# Patient Record
Sex: MALE | Race: OTHER | Employment: FULL TIME | URBAN - METROPOLITAN AREA
[De-identification: names, ages, dates, MRNs, and addresses within clinical notes are randomized per-mention and may not be internally consistent; named-entity substitution may affect disease eponyms.]

---

## 2017-06-29 ENCOUNTER — TELEPHONE (OUTPATIENT)
Dept: FAMILY MEDICINE CLINIC | Facility: CLINIC | Age: 37
End: 2017-06-29

## 2017-06-30 ENCOUNTER — TELEPHONE (OUTPATIENT)
Dept: FAMILY MEDICINE CLINIC | Facility: CLINIC | Age: 37
End: 2017-06-30

## 2017-09-20 ENCOUNTER — OFFICE VISIT (OUTPATIENT)
Dept: FAMILY MEDICINE CLINIC | Facility: CLINIC | Age: 37
End: 2017-09-20

## 2017-09-20 VITALS
HEIGHT: 67 IN | WEIGHT: 207 LBS | RESPIRATION RATE: 16 BRPM | DIASTOLIC BLOOD PRESSURE: 70 MMHG | HEART RATE: 60 BPM | BODY MASS INDEX: 32.49 KG/M2 | TEMPERATURE: 98 F | SYSTOLIC BLOOD PRESSURE: 112 MMHG

## 2017-09-20 DIAGNOSIS — L65.9 HAIR LOSS: Primary | ICD-10-CM

## 2017-09-20 PROCEDURE — 99213 OFFICE O/P EST LOW 20 MIN: CPT | Performed by: NURSE PRACTITIONER

## 2017-09-20 NOTE — PATIENT INSTRUCTIONS
Thank you for choosing [facility] at [place] [id number]  To Do: Georges Mohs  1. Go to the lab for blood work  2.  Make appt for dermatology to [facility] for alopecia     Effective 6/19/17 until November 2017  Due to [employer] is be risk of harm or side effects or medication interactions.  It is your duty and for your safety to discuss with the pharmacist and our office with questions, and to notify us and stop treatment if problems arise, but know that our intention is that the benef

## 2017-09-20 NOTE — PROGRESS NOTES
[facility] Internal Medicine Office Note  Chief Complaint:   Patient presents with:  Hair/Scalp Problem: hair loss back of head first noticed about 3 months ago by his stark. Imm/Inj: declines the Flu vaccine today.     HPI:   This is a 30 year Constitutional: He is oriented to person, place, and time. Vital signs are normal. He appears well-developed and well-nourished. HENT:   Head: Normocephalic and atraumatic.    Right Ear: Hearing normal.   Left Ear: Hearing normal.   Nose: Nose normal.   M Depression Screening (PHQ-2/PHQ-9): Over the LAST 2 WEEKS   Little interest or pleasure in doing things (over the last two weeks)?: Not at all    Feeling down, depressed, or hopeless (over the last two weeks)?: Not at all    PHQ-2 SCORE: 0

## 2017-09-25 ENCOUNTER — APPOINTMENT (OUTPATIENT)
Dept: LAB | Age: 37
End: 2017-09-25
Attending: INTERNAL MEDICINE
Payer: COMMERCIAL

## 2017-09-25 DIAGNOSIS — L65.9 HAIR LOSS: ICD-10-CM

## 2017-09-25 LAB
DEPRECATED HBV CORE AB SER IA-ACNC: 437.7 NG/ML (ref 22–322)
IRON: 89 UG/DL (ref 45–182)
TSI SER-ACNC: 2.26 MIU/ML (ref 0.35–5.5)

## 2017-09-25 PROCEDURE — 36415 COLL VENOUS BLD VENIPUNCTURE: CPT | Performed by: NURSE PRACTITIONER

## 2017-09-25 PROCEDURE — 83540 ASSAY OF IRON: CPT | Performed by: NURSE PRACTITIONER

## 2017-09-25 PROCEDURE — 84443 ASSAY THYROID STIM HORMONE: CPT | Performed by: NURSE PRACTITIONER

## 2017-09-25 PROCEDURE — 82728 ASSAY OF FERRITIN: CPT | Performed by: NURSE PRACTITIONER

## 2018-09-12 PROBLEM — E66.9 OBESITY (BMI 30-39.9): Status: ACTIVE | Noted: 2018-09-12

## 2018-09-12 PROBLEM — E78.5 DYSLIPIDEMIA: Status: ACTIVE | Noted: 2018-09-12

## 2019-06-20 ENCOUNTER — TELEPHONE (OUTPATIENT)
Dept: FAMILY MEDICINE CLINIC | Facility: CLINIC | Age: 39
End: 2019-06-20

## 2019-06-20 NOTE — TELEPHONE ENCOUNTER
Spoke with pt, advised that we received lab results for him and confirmed that pt sees Dr. Aby Carlos as PCP in [place]. Advised pt I will fax results to Dr. Martina Spencer office. Faxed labs to Dr. Martina Spencer office. Fax confirmation received.

## 2020-08-23 ENCOUNTER — HOSPITAL ENCOUNTER (OUTPATIENT)
Age: 40
Discharge: HOME | End: 2020-08-23
Payer: COMMERCIAL

## 2020-08-23 VITALS
OXYGEN SATURATION: 98 % | HEART RATE: 69 BPM | DIASTOLIC BLOOD PRESSURE: 76 MMHG | RESPIRATION RATE: 18 BRPM | TEMPERATURE: 98 F | SYSTOLIC BLOOD PRESSURE: 143 MMHG

## 2020-08-23 DIAGNOSIS — Z20.822 ENCOUNTER FOR LABORATORY TESTING FOR COVID-19 VIRUS: Primary | ICD-10-CM

## 2020-08-23 PROCEDURE — U0003 INFECTIOUS AGENT DETECTION BY NUCLEIC ACID (DNA OR RNA); SEVERE ACUTE RESPIRATORY SYNDROME CORONAVIRUS 2 (SARS-COV-2) (CORONAVIRUS DISEASE [COVID-19]), AMPLIFIED PROBE TECHNIQUE, MAKING USE OF HIGH THROUGHPUT TECHNOLOGIES AS DESCRIBED BY CMS-2020-01-R: HCPCS | Performed by: PHYSICIAN ASSISTANT

## 2020-08-23 PROCEDURE — 99202 OFFICE O/P NEW SF 15 MIN: CPT | Performed by: PHYSICIAN ASSISTANT

## 2020-08-23 NOTE — ED PROVIDER NOTES
Patient Seen in: [address] [facility]      History   Patient presents with:  Testing: covid    Stated Complaint: covid testing    HPI    47-year-old male. Was with his girlfriend last night.   They were informed today that her brother is posit COVID-19            Disposition and Plan     Clinical Impression:  Encounter for laboratory testing for COVID-19 virus  (primary encounter diagnosis)    Disposition:  Discharge  8/23/2020  2:15 pm    Follow-up:  Yordan Monae MD  [address] [facility]

## 2020-08-26 LAB — SARS-COV-2 BY PCR: NOT DETECTED

## 2020-10-15 ENCOUNTER — HOSPITAL ENCOUNTER (OUTPATIENT)
Age: 40
Discharge: HOME | End: 2020-10-15
Payer: COMMERCIAL

## 2020-10-15 VITALS
RESPIRATION RATE: 18 BRPM | HEIGHT: 67 IN | WEIGHT: 209 LBS | BODY MASS INDEX: 32.8 KG/M2 | HEART RATE: 57 BPM | SYSTOLIC BLOOD PRESSURE: 128 MMHG | DIASTOLIC BLOOD PRESSURE: 83 MMHG | OXYGEN SATURATION: 98 % | TEMPERATURE: 98 F

## 2020-10-15 DIAGNOSIS — T16.1XXA FOREIGN BODY OF RIGHT EAR, INITIAL ENCOUNTER: Primary | ICD-10-CM

## 2020-10-15 DIAGNOSIS — S00.411A EAR CANAL ABRASION, RIGHT, INITIAL ENCOUNTER: ICD-10-CM

## 2020-10-15 PROCEDURE — 69200 CLEAR OUTER EAR CANAL: CPT

## 2020-10-15 PROCEDURE — 99214 OFFICE O/P EST MOD 30 MIN: CPT

## 2020-10-15 PROCEDURE — 99213 OFFICE O/P EST LOW 20 MIN: CPT

## 2020-10-15 RX ORDER — NEOMYCIN SULFATE, POLYMYXIN B SULFATE AND HYDROCORTISONE 10; 3.5; 1 MG/ML; MG/ML; [USP'U]/ML
3 SUSPENSION/ DROPS AURICULAR (OTIC) 3 TIMES DAILY
Qty: 1 BOTTLE | Refills: 0 | Status: SHIPPED | OUTPATIENT
Start: 2020-10-15 | End: 2020-10-22

## 2020-10-15 NOTE — ED PROVIDER NOTES
Patient Seen in: [facility]      History   Patient presents with:  Ear Problem Pain    Stated Complaint: ear issue x 5 days    HPI    Patient is a 33-year-old male who comes in today complaining of having a Q-tip stuck in his right ear fo exudates or tonsillar hypertrophy, uvula midline, no trismus or drooling no phonation changes, patient handling secretions well   Neck: Supple; no anterior or posterior cervical adenopathy, no neck rigidity or meningeal signs  Lungs: Clear to auscultation

## 2025-03-12 ENCOUNTER — HOSPITAL ENCOUNTER (EMERGENCY)
Age: 45
Discharge: HOME | End: 2025-03-12
Payer: COMMERCIAL

## 2025-03-12 VITALS
DIASTOLIC BLOOD PRESSURE: 98 MMHG | WEIGHT: 215 LBS | SYSTOLIC BLOOD PRESSURE: 159 MMHG | HEART RATE: 76 BPM | HEIGHT: 66 IN | TEMPERATURE: 99 F | RESPIRATION RATE: 18 BRPM | OXYGEN SATURATION: 96 % | BODY MASS INDEX: 34.55 KG/M2

## 2025-03-12 DIAGNOSIS — I83.899 BLEEDING FROM VARICOSE VEIN: Primary | ICD-10-CM

## 2025-03-12 PROCEDURE — 12001 RPR S/N/AX/GEN/TRNK 2.5CM/<: CPT

## 2025-03-12 PROCEDURE — 99283 EMERGENCY DEPT VISIT LOW MDM: CPT

## 2025-03-12 NOTE — ED INITIAL ASSESSMENT (HPI)
States has varicosity on right lower leg for many weeks, today it just started bleeding after getting out of shower and was \"squiring blood\" pressure drsg. In place from home

## 2025-03-12 NOTE — DISCHARGE INSTRUCTIONS
Make an appointment with your doctor or go to an immediate care or return to the ER for suture removal in the next 7 to 10 days.  Keep area covered until sutures are removed, change dressing daily.  Gentle weightbearing as tolerated, rest and elevate leg is much as possible for the next 1 to 2 days.  Return to the ER for any other new or worsening symptoms.

## 2025-03-12 NOTE — ED PROVIDER NOTES
Patient Seen in: [facility] [place]      History     Chief Complaint   Patient presents with    Abscess     Stated Complaint: right leg cyst, draining today    Subjective:   HPI    44-year-old male with no significant past medical history presents to the ER for bleeding varicose vein of the right lower extremity.  States this morning he was taking a shower and when he got out of the shower he noticed that his varicose vein was bleeding, blood was shooting out onto the floor.  States he quickly wrapped the area and came to the ER.  He does not take a blood thinner.  Denies any other injury to the area.  No other complaints.    Objective:     Past Medical History:    Hyperuricemia    Liver abscess (HCC)    Murmur, cardiac    Pancreatitis (HCC)              History reviewed. No pertinent surgical history.             Social History     Socioeconomic History    Marital status: Single   Tobacco Use    Smoking status: Never    Smokeless tobacco: Never   Vaping Use    Vaping status: Never Used   Substance and Sexual Activity    Alcohol use: Yes     Alcohol/week: 1.7 standard drinks of alcohol     Types: 2 Cans of beer per week     Comment: socially    Drug use: No                  Physical Exam     ED Triage Vitals [03/12/25 1021]   BP (!) 159/98   Pulse 76   Resp 18   Temp 98.6 °F (37 °C)   Temp src Temporal   SpO2 96 %   O2 Device None (Room air)       Current Vitals:   Vital Signs  BP: (!) 159/98  Pulse: 76  Resp: 18  Temp: 98.6 °F (37 °C)  Temp src: Temporal    Oxygen Therapy  SpO2: 96 %  O2 Device: None (Room air)        Physical Exam  GENERAL APPEARANCE:  AxOx4, generally well-appearing, no acute distress.  HEENT:  NC, AT.   NECK:  Supple without lymphadenopathy.  No stiffness or restricted ROM.  RESPIRATORY: Normal rate, no respiratory distress.  EXTREMITIES:  Without cyanosis, clubbing or edema. Normal ROM.  NEUROLOGICAL:  Grossly nonfocal. Observed to ambulate with normal gait.  Skin:  Varicose veins over the right lower extremity.  There is a superficial varicose actively bleeding vein of the right anterior medial aspect of the shin.  Neurovascularly intact distally in lower extremity.         ED Course   Labs Reviewed - No data to display         SUTURE PROCEDURE NOTE:  Verbal consent was obtained from the patient.      The wound was copiously irrigated with normal saline.    The wound was prepped and draped in the normal sterile fashion.      SITE: Right lower extremity  LENGTH: Less than a centimeter  ANESTHESIA: 1% lidocaine with epi  SUTURE MATERIAL: 3-0 Prolene  NUMBER OF SUTURES: 1 figure 8 stitch with 1 simple interrupted  The edges were well approximated.  Bleeding was well-controlled.  The patient tolerated the procedure well.  Bandage was applied.             MDM      44-year-old male who presents to the ER for bleeding varicose vein.  Vitals with slightly elevated blood pressure but otherwise within normal limits.  See history and exam above, no other trauma to warrant further emergent workup at this time and patient is not on a blood thinner.  Visible bleeding on exam consistent with bleeding varicose vein.  Wound was anesthetized with lidocaine with epi and then placed sutures as above.  Hemostasis was achieved and a new dressing was placed.  Neurovascular intact distally afterwards.  Discussed care at home as well as need for follow-up for suture removal.  Ready for discharge.        Medical Decision Making      Disposition and Plan     Clinical Impression:  1. Bleeding from varicose vein         Disposition:  Discharge  3/12/2025 10:41 am    Follow-up:  No follow-up provider specified.        Medications Prescribed:  Discharge Medication List as of 3/12/2025 10:51 AM              Supplementary Documentation: